# Patient Record
Sex: FEMALE | Race: WHITE
[De-identification: names, ages, dates, MRNs, and addresses within clinical notes are randomized per-mention and may not be internally consistent; named-entity substitution may affect disease eponyms.]

---

## 2019-09-19 ENCOUNTER — HOSPITAL ENCOUNTER (OUTPATIENT)
Dept: HOSPITAL 47 - RADMRIMAIN | Age: 49
Discharge: HOME | End: 2019-09-19
Attending: FAMILY MEDICINE
Payer: MEDICARE

## 2019-09-19 DIAGNOSIS — M25.471: Primary | ICD-10-CM

## 2019-09-19 PROCEDURE — 73720 MRI LWR EXTREMITY W/O&W/DYE: CPT

## 2019-09-20 NOTE — MR
EXAMINATION TYPE: MR foot RT wo/w con

 

DATE OF EXAM: 9/19/2019

 

COMPARISON: None

 

HISTORY: Lump on the foot

 

CONTRAST: 

Standard multiplanar, multisequence MRI departmental protocol utilizing 7.5 mL intravenous gadolinium
 contrast.  

 

FINDINGS: The metatarsals are intact. Ankle mortise is anatomic. The collateral ligaments are intact.
 There is a mild ankle joint effusion. The medial and lateral flexor tendons of the ankle appear inta
ct. Achilles tendon appears normal. Plantar fascia appears normal. There is minimal subcutaneous juice
a over the forefoot. There is no evidence of a soft tissue mass. There are small degenerative cysts i
n the first metatarsal head. There is mild hallux valgus.

 

IMPRESSION: 

Mild subcutaneous edema of the forefoot. No evidence of a soft tissue mass.

 

Mild ankle joint effusion consistent with nonspecific synovitis. No evidence of ligament or tendon te
ar. No fracture.